# Patient Record
Sex: FEMALE | Race: BLACK OR AFRICAN AMERICAN
[De-identification: names, ages, dates, MRNs, and addresses within clinical notes are randomized per-mention and may not be internally consistent; named-entity substitution may affect disease eponyms.]

---

## 2020-08-17 ENCOUNTER — HOSPITAL ENCOUNTER (EMERGENCY)
Dept: HOSPITAL 62 - ER | Age: 35
Discharge: HOME | End: 2020-08-17
Payer: MEDICAID

## 2020-08-17 VITALS — SYSTOLIC BLOOD PRESSURE: 111 MMHG | DIASTOLIC BLOOD PRESSURE: 55 MMHG

## 2020-08-17 DIAGNOSIS — O99.333: ICD-10-CM

## 2020-08-17 DIAGNOSIS — Z76.0: Primary | ICD-10-CM

## 2020-08-17 DIAGNOSIS — Z79.899: ICD-10-CM

## 2020-08-17 DIAGNOSIS — F17.210: ICD-10-CM

## 2020-08-17 DIAGNOSIS — Z3A.00: ICD-10-CM

## 2020-08-17 DIAGNOSIS — F41.9: ICD-10-CM

## 2020-08-17 DIAGNOSIS — O99.343: ICD-10-CM

## 2020-08-17 PROCEDURE — 99281 EMR DPT VST MAYX REQ PHY/QHP: CPT

## 2020-08-17 NOTE — ER DOCUMENT REPORT
HPI





- HPI


Patient complains to provider of: Medication refill


Time Seen by Provider: 20 15:36


Onset: Other


Quality of pain: No pain


Severity: None


Pain Level: Denies


Context: 





34-year-old female presented to ED for med refill for clonazepam and Lexapro.  

She states she moved from [place] 3 and half weeks ago.  She states she is due 

.  She has established with [facility] for a visit but has not 

seen them yet.  She states she has been taking these medications for 4 years for

her anxiety.  She is  6 para 2.  She states she is smokes 4 cigarettes 

does not use alcohol or drugs.  She is seeking a refill on her anxiety 

medications.  I have spoken with Dr. Flower who is the attending physician at 

this time.  He states we do not refill clonazepam and anxiety medications.  He 

states she needs to follow-up with a mental health provider, primary care 

provider and a OB/GYN.  Have given her a copy of the mental health referral 

page.  I also given the name and number for [facility].

















Associated Symptoms: None


Exacerbated by: Denies


Relieved by: Denies


Similar symptoms previously: Yes


Recently seen / treated by doctor: No





- ROS


ROS below otherwise negative: Yes





- CONSTITUTIONAL


Constitutional: DENIES: Fever, Chills





- EENT


EENT: DENIES: Sore Throat, Ear Pain, Nasal Drainage-Clear, Nasal Drainage-

Purulent, Congestion, Eye problems





- NEURO


Neurology: DENIES: Headache, Weakness, Vision blurred, Dizzinesss / Vertigo





- CARDIOVASCULAR


Cardiovascular: DENIES: Chest pain





- RESPIRATORY


Respiratory: DENIES: Trouble Breathing, Coughing





- GASTROINTESTINAL


Gastrointestinal: DENIES: Abdominal Pain, Nausea, Patient vomiting, Diarrhea, 

Constipation, Black / Bloody Stools





- URINARY


Urinary: DENIES: Dysuria, Urgency, Frequency





- REPRODUCTIVE


Reproductive: REPORTS: Pregnant:





- MUSCULOSKELETAL


Musculoskeletal: DENIES: Extremity pain, Back Pain, Neck Pain, Swelling





- DERM


Skin Color: Normal


Skin Problems: None





Past Medical History





- General


Information source: Patient





- Social History


Smoking Status: Current Every Day Smoker


Cigarette use (# per day): Yes - 4 cigarettes a day


Smoking Education Provided: Yes


Frequency of alcohol use: None


Drug Abuse: None


Lives with: Family


Family History: Reviewed & Not Pertinent


Patient has suicidal ideation: No


Patient has homicidal ideation: No





- Past Medical History


Cardiac Medical History: Reports: None


Pulmonary Medical History: Reports: None


EENT Medical History: Reports: None


Neurological Medical History: Reports: None


Endocrine Medical History: Reports: None


Renal/ Medical History: Reports: None


Malignancy Medical History: Reports: None


GI Medical History: Reports: None


Musculoskeletal Medical History: Reports None


Skin Medical History: Reports None


Psychiatric Medical History: Reports: None


Traumatic Medical History: Reports: None


Infectious Medical History: Reports: None


Surgical Hx: Negative


Past Surgical History: Reports: None





Vertical Provider Document





- CONSTITUTIONAL


Agree With Documented VS: Yes


Exam Limitations: No Limitations


General Appearance: WD/WN





- INFECTION CONTROL


TRAVEL OUTSIDE OF THE [place] IN LAST 30 DAYS: No





- HEENT


HEENT: Atraumatic, Normal ENT Exam, Normocephalic, PERRLA





- GI/ABDOMEN


Notes: 





Her child is due .  She is obvious pregnant.





- BACK


Back: Normal Inspection





- MUSCULOSKELETAL/EXTREMETIES


Musculoskeletal/Extremeties: MAEW, FROM, Non-Tender





- NEURO


Level of Consciousness: Awake, Appropriate


Motor/Sensory: No Motor Deficit, No Sensory Deficit





- DERM


Integumentary: Warm, Dry, No Rash


Notes: 





Patient is very angry that we cannot refill her clonazepam and Lexapro the 

emergency room.  She has been in [place] for 3 and half week and has not 

obtained a provider to fill her medications as yet.  She does have some 

medications left in each bottle I did not open them to count them.  I have 

informed her that she needs to follow-up with her primary care doctor.  She 

states she was supposed to see [facility] but it was too hot to wait 

outside so she came to the emergency room I told her she needed to call the 

[facility] and try to get back into see them.





Course





- Vital Signs


Vital signs: 


                                        











Temp Pulse Resp BP Pulse Ox


 


 97.6 F   80   16   111/55 L  99 


 


 20 14:27  20 14:27  20 14:27  20 14:27  20 14:27














Discharge





- Discharge


Clinical Impression: 


 Request medication refill





Condition: Stable


Disposition: HOME, SELF-CARE


Additional Instructions: 


You were seen today for refill of clonazepam and Lexapro.  You are now 8 months 

pregnant.





I have spoken with the physician covering me and he stated that you need a 

primary care and an OB or mental health provider to refill these medications 

especially when you are pregnant.











I have given you a list of mental health providers that you can follow-up with.








FOLLOW-UP CARE:


If you have been referred to a physician for follow-up care, call the 

physicians office for an appointment as you were instructed or within the next 

two days.  If you experience worsening or a significant change in your symptoms,

notify the physician immediately or return to the Emergency Department at any 

time for re-evaluation.


Referrals: 


ROOPA SUAREZ CNM [NO LOCAL MD] - Follow up as needed


[facility] [Provider Group] - Follow up as needed


[facility] [Provider Group] - Follow up as needed 77

## 2020-08-19 ENCOUNTER — HOSPITAL ENCOUNTER (OUTPATIENT)
Dept: HOSPITAL 62 - RAD | Age: 35
End: 2020-08-19
Attending: MIDWIFE
Payer: SELF-PAY

## 2020-08-19 DIAGNOSIS — Z34.83: Primary | ICD-10-CM

## 2020-08-19 PROCEDURE — 76805 OB US >/= 14 WKS SNGL FETUS: CPT

## 2020-08-19 NOTE — RADIOLOGY REPORT (SQ)
EXAM DESCRIPTION:  U/S OB 14+ TRNABD 1GES W/O DOP



IMAGES COMPLETED DATE/TIME:  8/19/2020 2:44 pm



REASON FOR STUDY:  Z34.83 ENCOUNTER FOR SUPRVSN OF NORMAL PREGNANCY, THIRD TRIMESTER Z34.83  ENCOUNTE
R FOR SUPRVSN OF NORMAL PREGNANCY, THIRD TRIM



COMPARISON:  None.



TECHNIQUE:  Static and Dynamic grayscale imaging performed of gravid uterus using transabdominal appr
oach.  Additional selected color Doppler and spectral images recorded.  All stored on PACS.



LIMITATIONS:  None.



FINDINGS:  FETUSES SEEN:1

EGA: 36 weeks 1 day  Calculated using BPD,FL,HC,AC documented on images. No discrepancy with clinical
 dates.

YENNY: 9/15/2020

EFW: 2,964 grams

PERCENTILE: 48

ZA: 22.4

PLACENTA: Anterior.  GRADE: II

FETAL PRESENTATION: Cephalic.

FETAL ANATOMY:

FETAL HEART RATE: 139 beats per minute.

FOUR CHAMBER HEART: Visualized.

THREE VESSEL CORD: Yes.

CORD INSERTION: Visualized.

KIDNEYS AND BLADDER: Visualized. Appear normal.

STOMACH: Visualized. Appears normal.

SPINE: Normal as visualized.

BRAIN AND LATERAL VENTRICLES: Visualized. Appear normal.

OTHER: No other significant finding.

MATERNAL ADNEXA: Maternal ovaries not visualized.

CERVICAL LENGTH: 2.6 cm   Closed.

OTHER: No other significant finding.



IMPRESSION:  LIVING INTRAUTERINE PREGNANCY.

ESTIMATED GESTATIONAL AGE 36 weeks, 1 day

NO VISUALIZED ANOMALIES.

Trimester of pregnancy: Third trimester - 28 weeks to delivery.



TECHNICAL DOCUMENTATION:  JOB ID:  [id number]

 2011 [employer]- All Rights Reserved



Reading location - IP/workstation name: [email or web address]

## 2020-09-07 ENCOUNTER — HOSPITAL ENCOUNTER (OUTPATIENT)
Dept: HOSPITAL 62 - LC | Age: 35
Discharge: HOME | End: 2020-09-07
Attending: OBSTETRICS & GYNECOLOGY
Payer: MEDICAID

## 2020-09-07 DIAGNOSIS — O99.333: ICD-10-CM

## 2020-09-07 DIAGNOSIS — O36.8330: ICD-10-CM

## 2020-09-07 DIAGNOSIS — Z3A.39: ICD-10-CM

## 2020-09-07 DIAGNOSIS — F17.210: ICD-10-CM

## 2020-09-07 DIAGNOSIS — O47.1: Primary | ICD-10-CM

## 2020-09-07 DIAGNOSIS — O09.523: ICD-10-CM

## 2020-09-07 LAB
APPEARANCE UR: CLEAR
APTT PPP: YELLOW S
BARBITURATES UR QL SCN: NEGATIVE
BILIRUB UR QL STRIP: NEGATIVE
GLUCOSE UR STRIP-MCNC: NEGATIVE MG/DL
KETONES UR STRIP-MCNC: NEGATIVE MG/DL
METHADONE UR QL SCN: NEGATIVE
NITRITE UR QL STRIP: NEGATIVE
PCP UR QL SCN: NEGATIVE
PH UR STRIP: 7 [PH] (ref 5–9)
PROT UR STRIP-MCNC: NEGATIVE MG/DL
SP GR UR STRIP: 1.01
URINE AMPHETAMINES SCREEN: NEGATIVE
URINE BENZODIAZEPINES SCREEN: NEGATIVE
URINE COCAINE SCREEN: NEGATIVE
URINE MARIJUANA (THC) SCREEN: NEGATIVE
UROBILINOGEN UR-MCNC: 2 MG/DL (ref ?–2)

## 2020-09-07 PROCEDURE — 81005 URINALYSIS: CPT

## 2020-09-07 PROCEDURE — 59025 FETAL NON-STRESS TEST: CPT

## 2020-09-07 PROCEDURE — 80307 DRUG TEST PRSMV CHEM ANLYZR: CPT

## 2020-09-07 PROCEDURE — 84112 EVAL AMNIOTIC FLUID PROTEIN: CPT

## 2020-09-07 NOTE — NON STRESS TEST REPORT
=================================================================

Non Stress Test

=================================================================

Datetime Report Generated by CPN: 09/07/2020 17:51

   

   

=================================================================

DEMOGRAPHIC

=================================================================

   

EGA NST:  39.2

   

=================================================================

VITAL SIGNS

=================================================================

   

Pulse - NST:  81

NBPSYS NST:  99

NBPDIA NST:  60

   

=================================================================

MONITORING

=================================================================

   

Monitor Explained:  Monitor Explained; Test Explained; Patient

   Verbalized Understanding

Time on Monitor:  09/07/2020 15:40

Time off Monitor:  09/07/2020 17:48

   

=================================================================

NST INTERVENTIONS

=================================================================

   

NST Interventions:  Reposition Patient

Physician Notified NST:  Dr. Black on unit, reviewed fht

BABY A:  [id number]

   

=================================================================

BABY A

=================================================================

   

Fetal Movement :  Present

Contraction Frequency :  irregular

FHR Baseline :  130

Accelerations :  15X15

Decelerations :  Variable

Variability :  Moderate 6-25bpm

NST Review:  Meets Criteria for Reactive NST

NST Review and Verified By :  AFeuston, RN

NST Results:  Reactive

   

=================================================================

NST REPORT

=================================================================

   

Report Trigger:  Send Report

## 2020-09-08 ENCOUNTER — HOSPITAL ENCOUNTER (INPATIENT)
Dept: HOSPITAL 62 - LC | Age: 35
LOS: 2 days | Discharge: HOME | End: 2020-09-10
Attending: OBSTETRICS & GYNECOLOGY | Admitting: OBSTETRICS & GYNECOLOGY
Payer: MEDICAID

## 2020-09-08 DIAGNOSIS — Z20.828: ICD-10-CM

## 2020-09-08 DIAGNOSIS — Z79.899: ICD-10-CM

## 2020-09-08 DIAGNOSIS — F41.9: ICD-10-CM

## 2020-09-08 DIAGNOSIS — D62: ICD-10-CM

## 2020-09-08 DIAGNOSIS — Z3A.39: ICD-10-CM

## 2020-09-08 DIAGNOSIS — F17.210: ICD-10-CM

## 2020-09-08 LAB
ADD MANUAL DIFF: NO
BASOPHILS # BLD AUTO: 0.1 10^3/UL (ref 0–0.2)
BASOPHILS NFR BLD AUTO: 1.1 % (ref 0–2)
EOSINOPHIL # BLD AUTO: 0.2 10^3/UL (ref 0–0.6)
EOSINOPHIL NFR BLD AUTO: 2 % (ref 0–6)
ERYTHROCYTE [DISTWIDTH] IN BLOOD BY AUTOMATED COUNT: 14.4 % (ref 11.5–14)
HCT VFR BLD CALC: 31.1 % (ref 36–47)
HGB BLD-MCNC: 11 G/DL (ref 12–15.5)
LYMPHOCYTES # BLD AUTO: 2.8 10^3/UL (ref 0.5–4.7)
LYMPHOCYTES NFR BLD AUTO: 30.4 % (ref 13–45)
MCH RBC QN AUTO: 31 PG (ref 27–33.4)
MCHC RBC AUTO-ENTMCNC: 35.3 G/DL (ref 32–36)
MCV RBC AUTO: 88 FL (ref 80–97)
MONOCYTES # BLD AUTO: 0.9 10^3/UL (ref 0.1–1.4)
MONOCYTES NFR BLD AUTO: 9.8 % (ref 3–13)
NEUTROPHILS # BLD AUTO: 5.3 10^3/UL (ref 1.7–8.2)
NEUTS SEG NFR BLD AUTO: 56.7 % (ref 42–78)
PLATELET # BLD: 282 10^3/UL (ref 150–450)
RBC # BLD AUTO: 3.54 10^6/UL (ref 3.72–5.28)
TOTAL CELLS COUNTED % (AUTO): 100 %
WBC # BLD AUTO: 9.3 10^3/UL (ref 4–10.5)

## 2020-09-08 PROCEDURE — 85027 COMPLETE CBC AUTOMATED: CPT

## 2020-09-08 PROCEDURE — 86901 BLOOD TYPING SEROLOGIC RH(D): CPT

## 2020-09-08 PROCEDURE — 86592 SYPHILIS TEST NON-TREP QUAL: CPT

## 2020-09-08 PROCEDURE — 36415 COLL VENOUS BLD VENIPUNCTURE: CPT

## 2020-09-08 PROCEDURE — 85025 COMPLETE CBC W/AUTO DIFF WBC: CPT

## 2020-09-08 PROCEDURE — 86900 BLOOD TYPING SEROLOGIC ABO: CPT

## 2020-09-08 PROCEDURE — 86850 RBC ANTIBODY SCREEN: CPT

## 2020-09-08 RX ADMIN — FAMOTIDINE SCH MG: 20 TABLET, FILM COATED ORAL at 21:42

## 2020-09-08 RX ADMIN — IBUPROFEN SCH: 800 TABLET, FILM COATED ORAL at 05:01

## 2020-09-08 RX ADMIN — IBUPROFEN SCH MG: 800 TABLET, FILM COATED ORAL at 14:34

## 2020-09-08 RX ADMIN — FERROUS SULFATE TAB 325 MG (65 MG ELEMENTAL FE) SCH MG: 325 (65 FE) TAB at 10:27

## 2020-09-08 RX ADMIN — Medication SCH CAP: at 10:27

## 2020-09-08 RX ADMIN — ESCITALOPRAM OXALATE SCH: 10 TABLET, FILM COATED ORAL at 21:41

## 2020-09-08 RX ADMIN — DOCUSATE SODIUM SCH: 100 CAPSULE, LIQUID FILLED ORAL at 20:31

## 2020-09-08 RX ADMIN — FERROUS SULFATE TAB 325 MG (65 MG ELEMENTAL FE) SCH: 325 (65 FE) TAB at 20:31

## 2020-09-08 RX ADMIN — IBUPROFEN SCH MG: 800 TABLET, FILM COATED ORAL at 21:41

## 2020-09-08 RX ADMIN — SENNOSIDES, DOCUSATE SODIUM SCH EACH: 50; 8.6 TABLET, FILM COATED ORAL at 10:27

## 2020-09-08 RX ADMIN — DOCUSATE SODIUM SCH MG: 100 CAPSULE, LIQUID FILLED ORAL at 10:27

## 2020-09-08 RX ADMIN — FAMOTIDINE SCH MG: 20 TABLET, FILM COATED ORAL at 10:27

## 2020-09-08 NOTE — DELIVERY SUMMARY
=================================================================

Del Sum A-C

=================================================================

Datetime Report Generated by CPN: 2020 02:56

   

   

=================================================================

DELIVERY PERSONNEL

=================================================================

   

DELIVERY PERSONNEL:  [id number]

Delivery Doctor::  Cosme Black MD

Labor and Delivery Nurse::  Negrito Pate RN

Labor and Delivery Nurse::  Thania Gonzalez RN

Nursery Nurse::  CLAY GOODWIN

Scrub Tech/CNA:  Wendy Conway, ST

   

=================================================================

MATERNAL INFORMATION

=================================================================

   

Delivery Anesthesia:  None

Medications After Delivery:  Pitocin 30 Units in 500ml NS/D5W

Delivery QBL:  150

Maternal Complications:  Precipitous Labor (<3hrs)

   

=================================================================

LABOR SUMMARY

=================================================================

   

EDC:  2020 00:00

No. Babies in Womb:  1

 Attempted:  No

Labor Anesthesia:  None

   

=================================================================

LABOR INFORMATION

=================================================================

   

Reason for Induction:  Not Applicable

Onset of Labor:  2020 00:00

Complete Dilatation:  2020 01:08

Oxytocin:  N/A

Group B Beta Strep:  negative

Antibiotics # of Doses:  0

Steroids Given:  None

Reason Steroids Not Administered:  Not Applicable

   

=================================================================

MEMBRANES

=================================================================

   

Membranes Rupture Method:  Spontaneous

Rupture of Membranes:  2020 00:54

Length of Rupture (hr):  0.27

Amniotic Fluid Color:  Clear

Amniotic Fluid Amount:  Moderate

Amniotic Fluid Odor:  Normal

   

=================================================================

STAGES OF LABOR

=================================================================

   

Stage 1 hr:  1

Stage 1 min:  8

Stage 2 hr:  0

Stage 2 min:  2

Stage 3 hr:  0

Stage 3 min:  4

Total Time in Labor hr:  1

Total Time in Labor min:  14

   

=================================================================

VAGINAL DELIVERY

=================================================================

   

Episiotomy:  None

Laceration #1:  None

Laceration Extension #1:  N/A

Laceration Repair:  Not Applicable

Sponge Count Correct:  N/A

Sharps Count Correct:  N/A

   

=================================================================

CSECTION DELIVERY

=================================================================

   

Primary Indication:  N/A

Secondary Indication:  N/A

CSection Urgency:  N/A

CSection Incidence:  N/A

Labor:  N/A

CSection Incision:  N/A

   

=================================================================

BABY A INFORMATION

=================================================================

   

Infant Delivery Date/Time:  2020 01:10

Method of Delivery:  Vaginal

Nurse Controlled Delivery:  No

Born in Route :  No

:  N/A

Forceps:  N/A

Vacuum Extraction:  N/A

Shoulder Dystocia :  No

   

=================================================================

PRESENTATION/POSITION BABY A

=================================================================

   

Presentation:  Cephalic

Cephalic Presentation:  Vertex

Vertex Position:  Left Occipital Anterior

Breech Presentation:  N/A

   

=================================================================

PLACENTA INFORMATION BABY A

=================================================================

   

Placenta Delivery Time :  2020 01:14

Placenta Method of Delivery:  Spontaneous

Placenta Status:  Delivered

   

=================================================================

APGAR SCORES BABY A

=================================================================

   

Heart Rate 1 min:  >100 bpm

Resp Effort 1 min:  Good Cry

Reflex Irritability 1 min:  Cough or Sneeze or Pulls Away

Muscle Tone 1 min:  Some Flexion of Extremities

Color 1 min:  Blue/Pale

Resuscitation Effort 1 min:  Tactile Stimulation

APGAR SCORE 1 MIN:  7

Heart Rate 5 min:  >100 bpm

Resp Effort 5 min:  Good Cry

Reflex Irritability 5 min:  Cough or Sneeze or Pulls Away

Muscle Tone 5 min:  Some Flexion of Extremities

Color 5 min:  Body Pink, Extremities Blue

Resuscitation Effort 5 min:  Tactile Stimulation; Oxygen

APGAR SCORE 5 MIN:  8

   

=================================================================

INFANT INFORMATION BABY A

=================================================================

   

Gestational Age at Delivery:  39.3

Gestational Status:  Full Term- 39- 40.6 Weeks

Infant Outcome :  Liveborn

Infant Condition :  Stable

Infant Sex:  Male

   

=================================================================

IDENTIFICATION BABY A

=================================================================

   

Infant Verification Date/Time:  2020 01:22

ID Band Number:  [id number]

Mother's Name Verified:  Yes

Infant Medical Record Number:  [id number]

RN Verifying Infant:  OSIEL Angel RN/RAgatha MontelongoCarlos RN

   

=================================================================

WEIGHT/LENGTH BABY A

=================================================================

   

Infant Birthweight (gm):  2909

Infant Weight (lb):  6

Infant Weight (oz):  7

Infant Length (in):  19.50

Infant Length (cm):  49.53

   

=================================================================

CORD INFORMATION BABY A

=================================================================

   

No. Cord Vessels:  3

Nuchal Cord :  Around Neck x1, Loose

Cord Blood Taken:  Yes-For Eval (Mom's Blood Type - or O+)

Infant Suction:  Mouth; Nose

   

=================================================================

ASSESSMENT BABY A

=================================================================

   

Infant Complications:  None

Physical Findings at Delivery:  Other

Physical Findings- Other:  see nursery assessment 

Infant Respirations:  Appears Normal

Skin to Skin:  Yes

Skin to Skin Time (min):  60

Neonatologist/ALS Called :  No

Infant Care By:  CLAY Gao RN

Transferred To:  Remains with Mother

   

=================================================================

BABY B INFORMATION

=================================================================

   

 :  N/A

   

=================================================================

SIGNATURES

=================================================================

   

Signature:  Electronically signed by Cosme Black MD (WEB) on

   2020 at 01:19  with User ID: [email or web address]

## 2020-09-08 NOTE — ADMISSION PHYSICAL
=================================================================



=================================================================

Datetime Report Generated by CPN: 2020 01:18

   

   

=================================================================

CURRENT ADMISSION

=================================================================

   

Chief Complaint:  Uterine Contractions

Indication for Induction:  Not Applicable

Admit Impression :  Term, Intrauterine Pregnancy

Admit Plan:  Initiate Labor Protocol

   

=================================================================

ALLERGIES

=================================================================

   

Medication Allergies:  No

Medication Allergies:  No Known Allergies (2020)

Latex:  No Latex Allergies

   

=================================================================

OBSTETRICAL HISTORY

=================================================================

   

EDC:  2020 00:00

:  3

Para:  2

Term:  2

Gestational Diabetes:  No

Rh Sensitization:  No

Incompetent Cervix:  No

EDUARDO:  No

Infertility:  No

ART Treatment:  No

Uterine Anomaly:  No

IUGR:  No

Hx Previous C/S:  No

Macrosomia:  No

Hx Loss/Stillborn:  No

PIH:  No

Hx  Death:  No

Placenta Previa/Abruption:  No

Depression/PP Depression:  Yes

PTL/PROM:  No

Post Partum Hemorrhage:  No

   

=================================================================

***SEE PRENATAL RECORDS***

=================================================================

   

Alcohol:  No

Marijuana :  No

Cocaine:  No

Other Illicit Drugs:  No

Cigarettes:  Current Everyday Smoker. [id number]

Cigarette Frequency:  5 - 10 per day

Advised to Stop:  No

   

=================================================================

MEDICAL HISTORY

=================================================================

   

Diabetes:  No

Blood Transfusion:  No

Pulmonary Disease (Asthma, TB):  No

Breast Disease:  No

Hypertension:  No

Gyn Surgery:  No

Heart Disease:  No

Hosp/Surgery:  Yes

Autoimmune Disorder:  No

Anesthetic Complications:  No

Kidney Disease:  No

Abnormal Pap Smear:  No

Neuro/Epilepsy:  No

Psychiatric Disorders:  No

Other Medical Diseases:  No

Hepatitis/Liver Disease:  No

Significant Family History:  No

Varicosities/Phlebitis:  No

Trauma/Violence :  No

Thyroid Dysfunction:  No

Medical History Comments:  anxiety, childbirth

   

=================================================================

INFECTIOUS HISTORY

=================================================================

   

Gonorrhea:  No

Genital Herpes:  No

Chlamydia:  No

Tuberculosis:  No

Syphilis:  No

Hepatitis:  No

HIV/AIDS Exposure:  No

Rash or Viral Illness:  No

HPV:  No

   

=================================================================

PHYSICAL EXAM

=================================================================

   

General:  Normal

HEENT:  Normal

Neurologic:  Normal

Thyroid:  Normal

Heart:  Normal

Lungs:  Normal

Breast:  Deferred

Back:  Normal

Abdomen:  Normal

Genitourinary Exam:  Normal

Extremities:  Normal

DTRs:  Normal

Pelvic Type:  Adequate

   

=================================================================

FETUS A

=================================================================

   

EGA:  39.3

   

=================================================================

PLANS FOR LABOR AND DELIVERY

=================================================================

   

Labor and Delivery:  None

Pain Management:  Medications; Epidural

Feeding Preference:  Formula

Benefit of Breast Feed Discussed:  Yes

Circumcision:  Yes

   

=================================================================

INFORMED CONSENT

=================================================================

   

Signature:  Electronically signed by Cosme Black MD ([employer]) on

   2020 at 01:17  with User ID: [email or web address]

## 2020-09-08 NOTE — PDOC PROGRESS REPORT
Subjective-OB


Progress Note for:: 20 - Delivery Day. Pt doing well, no complaints, UOB, 

voiding, bottlefeeding





Physical Exam (OB)


Vital Signs: 


                                        











Temp Pulse Resp BP Pulse Ox


 


 98.2 F   76   16   97/55 L  100 


 


 20 08:00  20 08:00  20 08:00  20 08:00  20 08:00








                                 Intake & Output











 20





 06:59 06:59 06:59


 


Output Total   250


 


Balance   -250














- General


General Appearance: Appears well, Alert


In distress: None





- PIH/Pre-Eclampsia


Clonus: Negative


Headache: Absent


Epigastric Pain: No


Visual Changes: No





- Maternal Morbidity


59. Maternal Morbidity (serious complications experinced by the mother 

associated with labor and delivery: None of the above





- Lochia


Lochia Amount: Small 10-25 ml


Lochia Color: Rubra/Red





- Abdomen


Description: Soft


Hernia Present: No


Fundal Description: Firm, Midline


Fundal Height: u/u - u/2





- Respiratory


Respiratory Status: No respiratory distress





- Genitourinary


Genitourinary Note: 





voiding





- Neurological


Cognition: Normal


Orientation: AAOx4





Objective-Diagnostic


Laboratory: 


                                        





                                 20 01:02 





                                        











  20





  01:02 01:02


 


WBC  9.3 


 


RBC  3.54 L 


 


Hgb  11.0 L 


 


Hct  31.1 L 


 


MCV  88 


 


MCH  31.0 


 


MCHC  35.3 


 


RDW  14.4 H 


 


Plt Count  282 


 


Seg Neutrophils %  56.7 


 


Blood Type   O POSITIVE


 


Antibody Screen   NEGATIVE














Assessment and Plan(PN)





- Assessment and Plan


(1)  (normal spontaneous vaginal delivery)


Is this a current diagnosis for this admission?: Yes   


Plan:: 





Routine PP orders, ambulation encouraged





- Time Spent with Patient


Time with patient: Less than 15 minutes


Medications reviewed and adjusted accordingly: Yes





- Disposition


Anticipated Discharge Disposition: Home, Self Care


Anticipated Discharge Timeframe: within 48 hours

## 2020-09-08 NOTE — BIRTH CERTIFICATE DATA
=================================================================

Birth Cert Data

=================================================================

Datetime Report Generated by RENE: 2020 02:56

   

   

=================================================================

BIRTH CERTIFICATE DATA

=================================================================

   

 47a. Prenatal Care:  Yes    (2020 15:24:Negrito Pate RN)

 47b. Date of First Visit:  2020 00:00    (2020

   15:24:Negrito Pate RN)

 47c. Date of Last Visit:  2020 00:00    (2020

   15:24:Negrito Pate RN)

 47d. Number of Prenatal Visits:  6    (2020 15:24:Negrito Pate RN)

 48a. Number of Prev Live Births:  2    (2020 15:24:Negrito Pate RN)

 48b. Now Livin    (2020 15:24:Negrito Pate RN)

 48c. Live Births Now Dead:  0    (2020 15:24:QS system process)

 48e. Pregnancy Losses:  0    (2020 15:24:Negrito Pate RN)

   

=================================================================

RISK FACTORS IN THIS PREGNANCY

=================================================================

   

 49a. Diabetes:  No    (2020 15:24:Anum Greenberg RN)

 49b. Hypertension:  No    (2020 15:24:Anum Greenberg RN)

 49d. Stillborns:  No    (2020 15:24:Anum Greenberg RN)

 49d. IUGR:  No    (2020 15:24:Anum Greenberg RN)

 49e. Infertility Treatment:  No    (2020 15:24:Anum Greenberg RN)

   

=================================================================

Mother's Weight 

=================================================================

   

 51a. Pre-Pregnancy Weight (lbs):  159    (2020 15:24:Rafiqteddytamara Pate RN)

   

=================================================================

Infections Present/Treated

=================================================================

   

 53a. Gonorrhea:  No    (2020 15:24:Anum Greenberg RN)

 Results this Hospital Visit :  Negative    (2020 15:24:Anum Greenberg RN)

 53b. Syphilis:  No    (2020 15:24:Anum Greenberg RN)

 53c. Chlamydia:  No    (2020 15:24:Anum Greenberg RN)

 Results this Hospital Visit:  Negative    (2020 15:24:Anum Greenberg RN)

 53d. Hepatitis B:  No    (2020 15:24:Anum Greenberg RN)

 Results this Hospital Visit:  Negative    (2020 15:24:Anum Greenberg RN)

 53e. Hepatitis C:  Negative    (2020 15:24:Anum Greenberg RN)

 53h. Mother Tested for HBsAG:  Yes    (2020 15:24:Anum Greenberg RN)

 53i. Date Tested:  2020 00:00    (2020 15:24:Negrito Pate RN)

 53j. Test Result:  Negative    (2020 15:24:Anum Greenberg RN)

   

=================================================================

Cigarette Smoking 

=================================================================

   

   55a. Packs:  1/2    (2020 15:24:Negrito Pate RN)

   55b. Packs:  1/2    (2020 15:24:Negrito Pate RN)

 55c. 2nd Trimester of Preg- Ci    (2020 15:24:Anum Greenberg RN)

 55d. 3rd Trimester of Preg- Ci    (2020 15:24:Anum Greenberg RN)

   

=================================================================

Onset of Labor

=================================================================

   

 56a. PROM >12 Hrs:  0.27    (2020 00:54:QS system process)

 56b. Precipitous Labor <3 Hrs:  1    (2020 15:24:QS system

   process)

 56c. Prolonged Labor > 20 Hrs:  1    (2020 15:24:QS system

   process)

   

=================================================================



=================================================================

   

 57a. Induction of Labor:  N/A    (2020 15:24:Thania Gonzalez RN)

 57c. Non-Vertex Presentation A:  Vertex    (2020 15:24:Thania Gonzalez RN)

 57d. Steroids - Fetal Lung Mat:  None    (2020 15:24:Thania Gonzalez RN)

 57d. Steroids - Fetal Lung Mat:  Not Applicable    (2020

   15:24:Thania Gonzalez RN)

 57f. Mat Chorio or Temp >100.4:  98.0    (2020 15:24:Negrito Pate RN)

 57g. Moderate/Heavy Meconium:  Clear    (2020 00:54:Thania Gonzalez RN)

 57h. Fetal Intolerance of Labor:  N/A    (2020 15:24:Sara Angel RN)

:  N/A    (2020 15:24:Sara Angel RN)

 57i. Epidural/Spinal Anesthesia:  None    (2020 15:24:Sara Angel RN)

   

=================================================================

Method of Delivery

=================================================================

   

 58a. Forceps - Unsuccessful A:  N/A    (2020 15:24:Sara Angel RN)

 58b. Vacuum - Unsuccessful A:  N/A    (2020 15:24:Sara Angel RN)

   

=================================================================

58c. Presentation at Birth

=================================================================

   

 58c. Presentation at Birth - A :  Vertex    (2020

   15:24:Thania Gonzalez RN)

 58c. Presentation at Birth - A :  N/A    (2020 15:24:Thania Gonzalez RN)

 58c. Presentation at Birth - A :  Cephalic    (2020

   15:24:Thania Gonzalez RN)

   

=================================================================

Final Route and Method of Del 

=================================================================

   

 58d. Baby A Route/Delivery:  Vaginal    (2020 01:10:Thania Gonzalez RN)

 58e. Trial of Labor Attempted:  No    (2020 15:24:Thania Gonzalez RN)

 58e. Trial of Labor Attempted A:  N/A    (2020 15:24:Thania Gonzalez RN)

 58e. Trial of Labor Attempted B:  N/A    (2020 15:24:Thania Gonzalez RN)

   

=================================================================

Maternal Morbidity

=================================================================

   

 59b. 3rd or 4th Degree Lacs:  None    (2020 15:24:Cosme Black,

   MD ([facility]))

   

=================================================================



=================================================================

   

 Birthweight Baby A:  2909    (2020 15:24:Negrito Pate RN)

 60a. Pounds :  6    (2020 15:24:QS system process)

 60b. Ounces:  7    (2020 15:24:QS system process)

   

=================================================================

61. GA at Delivery 

=================================================================

   

 Baby A:  39.3    (2020 15:24:Thania Gonzalez RN)

:  Full Term- 39- 40.6 Weeks    (2020 15:24:QS system process)

   

=================================================================

62a. APGAR 5 Minute

=================================================================

   

 Baby A:  8    (2020 15:24:QS system process)

## 2020-09-09 LAB
ERYTHROCYTE [DISTWIDTH] IN BLOOD BY AUTOMATED COUNT: 14.4 % (ref 11.5–14)
HCT VFR BLD CALC: 27.9 % (ref 36–47)
HGB BLD-MCNC: 9.7 G/DL (ref 12–15.5)
MCH RBC QN AUTO: 30.7 PG (ref 27–33.4)
MCHC RBC AUTO-ENTMCNC: 34.6 G/DL (ref 32–36)
MCV RBC AUTO: 89 FL (ref 80–97)
PLATELET # BLD: 226 10^3/UL (ref 150–450)
RBC # BLD AUTO: 3.15 10^6/UL (ref 3.72–5.28)
WBC # BLD AUTO: 9.2 10^3/UL (ref 4–10.5)

## 2020-09-09 RX ADMIN — DOCUSATE SODIUM SCH: 100 CAPSULE, LIQUID FILLED ORAL at 18:50

## 2020-09-09 RX ADMIN — FAMOTIDINE SCH MG: 20 TABLET, FILM COATED ORAL at 21:06

## 2020-09-09 RX ADMIN — Medication SCH CAP: at 10:39

## 2020-09-09 RX ADMIN — FERROUS SULFATE TAB 325 MG (65 MG ELEMENTAL FE) SCH: 325 (65 FE) TAB at 10:40

## 2020-09-09 RX ADMIN — FAMOTIDINE SCH MG: 20 TABLET, FILM COATED ORAL at 10:39

## 2020-09-09 RX ADMIN — DOCUSATE SODIUM SCH MG: 100 CAPSULE, LIQUID FILLED ORAL at 10:39

## 2020-09-09 RX ADMIN — FERROUS SULFATE TAB 325 MG (65 MG ELEMENTAL FE) SCH: 325 (65 FE) TAB at 18:50

## 2020-09-09 RX ADMIN — SENNOSIDES, DOCUSATE SODIUM SCH EACH: 50; 8.6 TABLET, FILM COATED ORAL at 10:39

## 2020-09-09 RX ADMIN — IBUPROFEN SCH MG: 800 TABLET, FILM COATED ORAL at 05:11

## 2020-09-09 RX ADMIN — IBUPROFEN SCH MG: 800 TABLET, FILM COATED ORAL at 21:06

## 2020-09-09 RX ADMIN — FERROUS SULFATE TAB 325 MG (65 MG ELEMENTAL FE) SCH MG: 325 (65 FE) TAB at 10:39

## 2020-09-09 RX ADMIN — ESCITALOPRAM OXALATE SCH MG: 10 TABLET, FILM COATED ORAL at 21:06

## 2020-09-09 RX ADMIN — Medication SCH: at 10:42

## 2020-09-09 RX ADMIN — IBUPROFEN SCH MG: 800 TABLET, FILM COATED ORAL at 16:29

## 2020-09-09 NOTE — PDOC PROGRESS REPORT
Subjective-OB


Progress Note for:: 20


Subjective: 





33yo  s/p vaginal delivery ppd1. Pt ambulating, voiding and passing gas 

without difficulty. Reports pain well controlled with medication. No concerns 

today.





Physical Exam (OB)


Vital Signs: 


                                        











Temp Pulse Resp BP Pulse Ox


 


 98.0 F   79   18   105/58 L  100 


 


 20 10:30  20 19:04  20 19:04  20 19:04  20 19:04








                                 Intake & Output











 09/08/20 09/09/20 09/10/20





 06:59 06:59 06:59


 


Intake Total  650 300


 


Output Total  250 


 


Balance  400 300


 


Weight  72.8 kg 














- General


General Appearance: Appears well


In distress: None





- PIH/Pre-Eclampsia


Clonus: Negative


Headache: Absent


Epigastric Pain: No


Visual Changes: No





- Maternal Morbidity


59. Maternal Morbidity (serious complications experinced by the mother 

associated with labor and delivery: None of the above





- Episiotomy/Laceration


Site Condition: N/A





- Lochia


Lochia Amount: Small 10-25 ml


Lochia Color: Rubra/Red





- Abdomen


Description: Soft


Hernia Present: No


Fundal Description: Firm, Midline


Fundal Height: u/u - u/2





- Respiratory


Respiratory Status: No respiratory distress





- Extremities


Upper extremity: Normal inspection


Lower extremities: Normal inspection





- Neurological


Cognition: Normal


Orientation: AAOx4





- Psychological


Associated symptoms: Normal affect, Normal mood





Objective-Diagnostic


Laboratory: 


                                        





                                 20 07:43 





                                        











  20





  07:43


 


WBC  9.2


 


RBC  3.15 L


 


Hgb  9.7 L


 


Hct  27.9 L


 


MCV  89


 


MCH  30.7


 


MCHC  34.6


 


RDW  14.4 H


 


Plt Count  226














Assessment and Plan(PN)





- Assessment and Plan


(1) Acute blood loss anemia


Is this a current diagnosis for this admission?: Yes   


Plan: 


 increase dietary iron and FeSO4 BID








(2) Smoking (tobacco) complicating pregnancy, third trimester


Is this a current diagnosis for this admission?: Yes   


Plan: 


cessation encouraged 








(3) Late prenatal care complicating pregnancy


Qualifiers: 


   Trimester: unspecified trimester   Qualified Code(s): O09.30 - Supervision of

pregnancy with insufficient  care, unspecified trimester   


Is this a current diagnosis for this admission?: Yes   


Plan: 


delivered








(4)  (normal spontaneous vaginal delivery)


Is this a current diagnosis for this admission?: Yes   


Plan: 


routine pp care 








- Time Spent with Patient


Time with patient: Less than 15 minutes


Smoking Education Provided: Over 3 minutes


Medications reviewed and adjusted accordingly: Yes





- Disposition


Anticipated Discharge Disposition: Home, Self Care


Anticipated Discharge Timeframe: within 24 hours

## 2020-09-10 VITALS — DIASTOLIC BLOOD PRESSURE: 75 MMHG | SYSTOLIC BLOOD PRESSURE: 120 MMHG

## 2020-09-10 RX ADMIN — IBUPROFEN SCH MG: 800 TABLET, FILM COATED ORAL at 05:44

## 2020-09-10 RX ADMIN — FERROUS SULFATE TAB 325 MG (65 MG ELEMENTAL FE) SCH: 325 (65 FE) TAB at 09:35

## 2020-09-10 RX ADMIN — Medication SCH: at 09:35

## 2020-09-10 RX ADMIN — SENNOSIDES, DOCUSATE SODIUM SCH: 50; 8.6 TABLET, FILM COATED ORAL at 09:35

## 2020-09-10 RX ADMIN — FAMOTIDINE SCH MG: 20 TABLET, FILM COATED ORAL at 09:47

## 2020-09-10 RX ADMIN — IBUPROFEN SCH MG: 800 TABLET, FILM COATED ORAL at 13:22

## 2020-09-10 RX ADMIN — DOCUSATE SODIUM SCH: 100 CAPSULE, LIQUID FILLED ORAL at 09:35

## 2020-09-10 NOTE — PDOC DISCHARGE SUMMARY
Impression





- Admit/DC Date/PCP


Admission Date/Primary Care Provider: 


  20 00:56





  ORAL FERRIS MD





Discharge Date: 09/10/20





- Discharge Diagnosis


(1) Anxiety


Is this a current diagnosis for this admission?: Yes   





(2) Late prenatal care complicating pregnancy


Is this a current diagnosis for this admission?: Yes   





(3)  (normal spontaneous vaginal delivery)


Is this a current diagnosis for this admission?: Yes   





(4) Smoking (tobacco) complicating pregnancy, third trimester


Is this a current diagnosis for this admission?: Yes   





- Additional Information


Discharge Diet: Regular


Discharge Activity: Balance Activity w/Rest, Pelvic Rest


Referrals: 


ORAL FERRIS MD [Primary Care Provider] - 


Prescriptions: 


Ibuprofen [Motrin 800 mg Tablet] 800 mg PO Q8HP PRN #60 tablet


 PRN Reason: 


Home Medications: 








Clonazepam [Klonopin] 1 mg PO DAILYP PRN 20 


Escitalopram Oxalate [Lexapro] 30 mg PO QHS 20 


Ibuprofen [Motrin 800 mg Tablet] 800 mg PO Q8HP PRN #60 tablet 09/10/20 


Prenatal Vit/Dha [Prenatal Multi + Dha Capsule] 1 cap PO DAILY  capsule 09/10/20













Hospital Course


59. Maternal Morbidity (serious complications experinced by the mother 

associated with labor and delivery: None of the above





Results


Laboratory Results: 


                                        











WBC  9.2 10^3/uL (4.0-10.5)   20  07:43    


 


RBC  3.15 10^6/uL (3.72-5.28)  L  20  07:43    


 


Hgb  9.7 g/dL (12.0-15.5)  L  20  07:43    


 


Hct  27.9 % (36.0-47.0)  L  20  07:43    


 


MCV  89 fl (80-97)   20  07:43    


 


MCH  30.7 pg (27.0-33.4)   20  07:43    


 


MCHC  34.6 g/dL (32.0-36.0)   20  07:43    


 


RDW  14.4 % (11.5-14.0)  H  20  07:43    


 


Plt Count  226 10^3/uL (150-450)   20  07:43    


 


Lymph % (Auto)  30.4 % (13-45)   20  01:02    


 


Mono % (Auto)  9.8 % (3-13)   20  01:02    


 


Eos % (Auto)  2.0 % (0-6)   20  01:02    


 


Baso % (Auto)  1.1 % (0-2)   20  01:02    


 


Absolute Neuts (auto)  5.3 10^3/uL (1.7-8.2)   20  01:02    


 


Absolute Lymphs (auto)  2.8 10^3/uL (0.5-4.7)   20  01:02    


 


Absolute Monos (auto)  0.9 10^3/uL (0.1-1.4)   20  01:02    


 


Absolute Eos (auto)  0.2 10^3/uL (0.0-0.6)   20  01:02    


 


Absolute Basos (auto)  0.1 10^3/uL (0.0-0.2)   20  01:02    


 


Seg Neutrophils %  56.7 % (42-78)   20  01:02    


 


RPR  NONREACTIVE  (NONREACTIVE)   20  01:02    


 


Blood Type  O POSITIVE   20  01:02    


 


Antibody Screen  NEGATIVE   20  01:02    














Plan


Plan of Treatment: 


follow up in 4 weeks at [facility] for post partum check